# Patient Record
Sex: MALE | Race: WHITE | Employment: FULL TIME | URBAN - METROPOLITAN AREA
[De-identification: names, ages, dates, MRNs, and addresses within clinical notes are randomized per-mention and may not be internally consistent; named-entity substitution may affect disease eponyms.]

---

## 2017-08-30 ENCOUNTER — TELEPHONE (OUTPATIENT)
Dept: FAMILY MEDICINE CLINIC | Facility: CLINIC | Age: 47
End: 2017-08-30

## 2017-10-16 ENCOUNTER — OFFICE VISIT (OUTPATIENT)
Dept: FAMILY MEDICINE CLINIC | Facility: CLINIC | Age: 47
End: 2017-10-16

## 2017-10-16 VITALS
WEIGHT: 241 LBS | DIASTOLIC BLOOD PRESSURE: 84 MMHG | BODY MASS INDEX: 30 KG/M2 | SYSTOLIC BLOOD PRESSURE: 148 MMHG | HEART RATE: 71 BPM

## 2017-10-16 DIAGNOSIS — R10.84 GENERALIZED ABDOMINAL PAIN: ICD-10-CM

## 2017-10-16 DIAGNOSIS — Z00.129 ENCOUNTER FOR ROUTINE CHILD HEALTH EXAMINATION WITHOUT ABNORMAL FINDINGS: ICD-10-CM

## 2017-10-16 DIAGNOSIS — D17.1 LIPOMA OF TORSO: Primary | ICD-10-CM

## 2017-10-16 PROCEDURE — 99214 OFFICE O/P EST MOD 30 MIN: CPT | Performed by: FAMILY MEDICINE

## 2017-10-16 PROCEDURE — 99212 OFFICE O/P EST SF 10 MIN: CPT | Performed by: FAMILY MEDICINE

## 2017-10-16 NOTE — PROGRESS NOTES
HPI:    Patient ID: Jaren Wilson is a 50year old male. HPI  Patient presents with:  Abdominal Pain: c/o middle abdominal pain  Lump: lump on left lower back  Has had lump for years. Review of Systems   Constitutional: Negative.     Gastrointestinal: N

## 2017-10-17 ENCOUNTER — LAB ENCOUNTER (OUTPATIENT)
Dept: LAB | Facility: HOSPITAL | Age: 47
End: 2017-10-17
Attending: FAMILY MEDICINE
Payer: COMMERCIAL

## 2017-10-17 DIAGNOSIS — Z00.129 ENCOUNTER FOR ROUTINE CHILD HEALTH EXAMINATION WITHOUT ABNORMAL FINDINGS: ICD-10-CM

## 2017-10-17 PROCEDURE — 85025 COMPLETE CBC W/AUTO DIFF WBC: CPT

## 2017-10-17 PROCEDURE — 36415 COLL VENOUS BLD VENIPUNCTURE: CPT

## 2017-10-17 PROCEDURE — 80061 LIPID PANEL: CPT

## 2017-10-17 PROCEDURE — 81003 URINALYSIS AUTO W/O SCOPE: CPT

## 2017-10-17 PROCEDURE — 80053 COMPREHEN METABOLIC PANEL: CPT

## 2017-10-24 ENCOUNTER — TELEPHONE (OUTPATIENT)
Dept: FAMILY MEDICINE CLINIC | Facility: CLINIC | Age: 47
End: 2017-10-24

## 2017-10-24 NOTE — TELEPHONE ENCOUNTER
Pt is calling want to know if his lab result can be sent to [facility] please  Pt is requesting a call back

## 2017-10-25 NOTE — TELEPHONE ENCOUNTER
Dr Amie Ragsdale,    Pt inquiring on his 10/17/17 lab results and [facility].

## 2017-11-02 ENCOUNTER — OFFICE VISIT (OUTPATIENT)
Dept: GASTROENTEROLOGY | Facility: CLINIC | Age: 47
End: 2017-11-02

## 2017-11-02 ENCOUNTER — TELEPHONE (OUTPATIENT)
Dept: GASTROENTEROLOGY | Facility: CLINIC | Age: 47
End: 2017-11-02

## 2017-11-02 VITALS
SYSTOLIC BLOOD PRESSURE: 136 MMHG | HEIGHT: 75 IN | HEART RATE: 71 BPM | BODY MASS INDEX: 29.22 KG/M2 | WEIGHT: 235 LBS | DIASTOLIC BLOOD PRESSURE: 80 MMHG

## 2017-11-02 DIAGNOSIS — Z80.0 FAMILY HISTORY OF COLON CANCER IN FATHER: ICD-10-CM

## 2017-11-02 DIAGNOSIS — Z12.11 COLON CANCER SCREENING: Primary | ICD-10-CM

## 2017-11-02 DIAGNOSIS — Z80.0 FAMILY HISTORY OF COLON CANCER: ICD-10-CM

## 2017-11-02 DIAGNOSIS — Z12.11 ENCOUNTER FOR SCREENING COLONOSCOPY: Primary | ICD-10-CM

## 2017-11-02 PROCEDURE — 99243 OFF/OP CNSLTJ NEW/EST LOW 30: CPT | Performed by: PHYSICIAN ASSISTANT

## 2017-11-02 PROCEDURE — 99212 OFFICE O/P EST SF 10 MIN: CPT | Performed by: PHYSICIAN ASSISTANT

## 2017-11-02 RX ORDER — ENZYMES,DIGESTIVE
CAPSULE ORAL
COMMUNITY

## 2017-11-02 RX ORDER — CHLORHEXIDINE GLUCONATE 0.12 MG/ML
RINSE ORAL
Refills: 0 | COMMUNITY
Start: 2017-10-26

## 2017-11-02 NOTE — H&P
[address] [facility] [address] Gastroenterology                                                                                                  Clinic History and Physical     Pa status: Never Smoker                                                              Smokeless tobacco: Never Used                      Alcohol use:  Yes              Comment: daily       Medications (Active prior to today's visit):    Current Outpatient Presc Labs/Imaging:     Patient's labs and imaging were reviewed and discussed with patient today. ASSESSMENT/PLAN:   Luara Oliva is a 50year old male patient of Dr. John Velez with family history of colon cancer (father, diagnosed at age ? 46s) who prese PREP KIT) 17.5-3.13-1.6 GM/180ML Oral Solution 1 Bottle 0      Sig: Take as directed           Imaging & Referrals:  None     CC: Dr. Lis Bowens.  Leanne Saldivar PA-C  11/2/2017

## 2017-11-02 NOTE — PATIENT INSTRUCTIONS
1. Schedule colonoscopy with Dr. Allison Maldonado with IV twilight sedation. 2.  bowel prep from pharmacy - I have prescribed Suprep. This is a smaller volume preparation.  If this is too expensive, however, please call my office and we will order an alter

## 2017-11-02 NOTE — TELEPHONE ENCOUNTER
Scheduled for:  Colonoscopy 50965  Provider Name: Dr García Garcia  Date:  Swati Ellsworth 11/30/17  Location:  [facility]  Sedation:  IV  Time:  10:00 am  Prep: split dose suprep  Meds/Allergies Reconciled?:  NKDA  Diagnosis with codes:  Colon cancer screening Z12.11, [address] [facility] Z80.0  W

## 2017-11-06 NOTE — TELEPHONE ENCOUNTER
I called and spoke to pt about rescheduling his colonoscopy procedure to [facility] 12/19/17 @ [address] [place] [address] @ 9:00 am, pt agreed.     Scheduled for:  Colonoscopy 55961  Provider Name: Dr Jaclyn Bartlett  Date:  Priti Wu from 11/30/17 to [facility] 12/19/17   Location:  Moved from [facility] to [address] [place] [address]

## 2017-11-16 ENCOUNTER — TELEPHONE (OUTPATIENT)
Dept: GASTROENTEROLOGY | Facility: CLINIC | Age: 47
End: 2017-11-16

## 2017-11-16 DIAGNOSIS — Z12.11 COLON CANCER SCREENING: Primary | ICD-10-CM

## 2017-11-16 DIAGNOSIS — Z80.0 FAMILY HISTORY OF COLON CANCER: ICD-10-CM

## 2017-11-17 NOTE — TELEPHONE ENCOUNTER
Rescheduled for:  Colonoscopy 87695  Provider Name: Dr. Jesi Johnson  Date:    From-12/19/17 To-1/23/18  Location:  [facility]  Sedation:  IV  Time:    From-0900  To-0800 (pt is aware to arrive at 0700)   Prep:  Suprep,  Mailed new instructions on 11/17/17  Meds/Allerg

## 2018-01-19 ENCOUNTER — TELEPHONE (OUTPATIENT)
Dept: GASTROENTEROLOGY | Facility: CLINIC | Age: 48
End: 2018-01-19

## 2018-01-19 DIAGNOSIS — Z80.0 FAMILY HISTORY OF MALIGNANT NEOPLASM OF GASTROINTESTINAL TRACT: ICD-10-CM

## 2018-01-19 DIAGNOSIS — Z12.11 SPECIAL SCREENING FOR MALIGNANT NEOPLASMS, COLON: Primary | ICD-10-CM

## 2018-01-19 NOTE — TELEPHONE ENCOUNTER
I spoke to the pt. He states he needs to reschedule his colonoscopy due to another surgery he will be having. He would like to reschedule    appt cancelled in [facility].     Surgical change request sent    Routed back to surgery scheduling

## 2018-01-22 NOTE — TELEPHONE ENCOUNTER
CBLM to schedule procedure. Please transfer to [facility] at ext [phone] or [phone] for scheduling. Or please transfer to [facility] in GI if unavailable.

## 2018-03-08 NOTE — TELEPHONE ENCOUNTER
CBLM to schedule procedure. Please transfer to Juanita Grijalva at ext [phone] or [phone] for scheduling. Or please transfer to [facility] in GI if unavailable.

## 2018-05-03 NOTE — TELEPHONE ENCOUNTER
I spoke with this patient to schedule his procedure which he did not have time today since he is about to go out of town. He requested I call him Monday (mid-day) to schedule.

## 2018-05-07 NOTE — TELEPHONE ENCOUNTER
CBLM to schedule procedure.  Please transfer to [facility] in GI    3 attempts were made to contact this patient, I sent a \"no response\" letter out today and closing this TE

## 2019-03-11 ENCOUNTER — WALK IN (OUTPATIENT)
Dept: URGENT CARE | Age: 49
End: 2019-03-11

## 2019-03-11 DIAGNOSIS — Z11.1 SCREENING-PULMONARY TB: Primary | ICD-10-CM

## 2019-03-11 PROCEDURE — 86580 TB INTRADERMAL TEST: CPT | Performed by: NURSE PRACTITIONER

## 2019-03-13 ENCOUNTER — WALK IN (OUTPATIENT)
Dept: URGENT CARE | Age: 49
End: 2019-03-13

## 2019-03-13 DIAGNOSIS — Z11.1 ENCOUNTER FOR PPD SKIN TEST READING: Primary | ICD-10-CM

## 2019-03-13 LAB
INDURATION: 0 MM (ref 0–?)
SKIN TEST RESULT: NEGATIVE

## 2019-03-13 PROCEDURE — X1094 NO CHARGE VISIT: HCPCS | Performed by: NURSE PRACTITIONER

## 2020-02-20 ENCOUNTER — WALK IN (OUTPATIENT)
Dept: URGENT CARE | Age: 50
End: 2020-02-20

## 2020-02-20 DIAGNOSIS — Z11.1 SCREENING-PULMONARY TB: Primary | ICD-10-CM

## 2020-02-20 PROCEDURE — 86580 TB INTRADERMAL TEST: CPT | Performed by: NURSE PRACTITIONER

## 2020-02-22 ENCOUNTER — WALK IN (OUTPATIENT)
Dept: URGENT CARE | Age: 50
End: 2020-02-22

## 2020-02-22 DIAGNOSIS — Z11.1 ENCOUNTER FOR PPD SKIN TEST READING: Primary | ICD-10-CM

## 2020-02-22 LAB
INDURATION: NORMAL MM (ref 0–?)
SKIN TEST RESULT: NEGATIVE

## 2020-02-22 PROCEDURE — X1094 NO CHARGE VISIT: HCPCS | Performed by: NURSE PRACTITIONER

## 2021-09-13 ENCOUNTER — HOSPITAL ENCOUNTER (EMERGENCY)
Facility: HOSPITAL | Age: 51
Discharge: HOME | End: 2021-09-13
Attending: EMERGENCY MEDICINE
Payer: COMMERCIAL

## 2021-09-13 ENCOUNTER — APPOINTMENT (OUTPATIENT)
Dept: CT IMAGING | Facility: HOSPITAL | Age: 51
End: 2021-09-13
Attending: NURSE PRACTITIONER
Payer: COMMERCIAL

## 2021-09-13 VITALS
SYSTOLIC BLOOD PRESSURE: 153 MMHG | TEMPERATURE: 98 F | RESPIRATION RATE: 20 BRPM | HEIGHT: 75 IN | OXYGEN SATURATION: 97 % | WEIGHT: 265 LBS | DIASTOLIC BLOOD PRESSURE: 95 MMHG | BODY MASS INDEX: 32.95 KG/M2 | HEART RATE: 89 BPM

## 2021-09-13 DIAGNOSIS — N13.30 HYDROURETERONEPHROSIS: ICD-10-CM

## 2021-09-13 DIAGNOSIS — N20.0 KIDNEY STONE: Primary | ICD-10-CM

## 2021-09-13 LAB
ANION GAP SERPL CALC-SCNC: 8 MMOL/L (ref 0–18)
BASOPHILS # BLD AUTO: 0.04 X10(3) UL (ref 0–0.2)
BASOPHILS NFR BLD AUTO: 0.3 %
BILIRUB UR QL: NEGATIVE
BUN BLD-MCNC: 15 MG/DL (ref 7–18)
BUN/CREAT SERPL: 12 (ref 10–20)
CALCIUM BLD-MCNC: 9 MG/DL (ref 8.5–10.1)
CHLORIDE SERPL-SCNC: 103 MMOL/L (ref 98–112)
CLARITY UR: CLEAR
CO2 SERPL-SCNC: 28 MMOL/L (ref 21–32)
COLOR UR: YELLOW
CREAT BLD-MCNC: 1.25 MG/DL
DEPRECATED RDW RBC AUTO: 39.3 FL (ref 35.1–46.3)
EOSINOPHIL # BLD AUTO: 0.01 X10(3) UL (ref 0–0.7)
EOSINOPHIL NFR BLD AUTO: 0.1 %
ERYTHROCYTE [DISTWIDTH] IN BLOOD BY AUTOMATED COUNT: 12.3 % (ref 11–15)
GLUCOSE BLD-MCNC: 138 MG/DL (ref 70–99)
GLUCOSE UR-MCNC: >=500 MG/DL
HCT VFR BLD AUTO: 46.2 %
HGB BLD-MCNC: 15.9 G/DL
HYALINE CASTS #/AREA URNS AUTO: PRESENT /LPF
IMM GRANULOCYTES # BLD AUTO: 0.03 X10(3) UL (ref 0–1)
IMM GRANULOCYTES NFR BLD: 0.3 %
KETONES UR-MCNC: NEGATIVE MG/DL
LEUKOCYTE ESTERASE UR QL STRIP.AUTO: NEGATIVE
LYMPHOCYTES # BLD AUTO: 1.1 X10(3) UL (ref 1–4)
LYMPHOCYTES NFR BLD AUTO: 9.3 %
MCH RBC QN AUTO: 29.9 PG (ref 26–34)
MCHC RBC AUTO-ENTMCNC: 34.4 G/DL (ref 31–37)
MCV RBC AUTO: 86.8 FL
MONOCYTES # BLD AUTO: 0.43 X10(3) UL (ref 0.1–1)
MONOCYTES NFR BLD AUTO: 3.7 %
NEUTROPHILS # BLD AUTO: 10.16 X10 (3) UL (ref 1.5–7.7)
NEUTROPHILS # BLD AUTO: 10.16 X10(3) UL (ref 1.5–7.7)
NEUTROPHILS NFR BLD AUTO: 86.3 %
NITRITE UR QL STRIP.AUTO: NEGATIVE
OSMOLALITY SERPL CALC.SUM OF ELEC: 291 MOSM/KG (ref 275–295)
PH UR: 6 [PH] (ref 5–8)
PLATELET # BLD AUTO: 293 10(3)UL (ref 150–450)
POTASSIUM SERPL-SCNC: 3.7 MMOL/L (ref 3.5–5.1)
PROT UR-MCNC: 30 MG/DL
RBC # BLD AUTO: 5.32 X10(6)UL
RBC #/AREA URNS AUTO: >10 /HPF
SODIUM SERPL-SCNC: 139 MMOL/L (ref 136–145)
SP GR UR STRIP: 1.01 (ref 1–1.03)
UROBILINOGEN UR STRIP-ACNC: <2
WBC # BLD AUTO: 11.8 X10(3) UL (ref 4–11)

## 2021-09-13 PROCEDURE — 96374 THER/PROPH/DIAG INJ IV PUSH: CPT

## 2021-09-13 PROCEDURE — 99284 EMERGENCY DEPT VISIT MOD MDM: CPT

## 2021-09-13 PROCEDURE — 74176 CT ABD & PELVIS W/O CONTRAST: CPT | Performed by: NURSE PRACTITIONER

## 2021-09-13 PROCEDURE — 80048 BASIC METABOLIC PNL TOTAL CA: CPT | Performed by: NURSE PRACTITIONER

## 2021-09-13 PROCEDURE — 81001 URINALYSIS AUTO W/SCOPE: CPT | Performed by: NURSE PRACTITIONER

## 2021-09-13 PROCEDURE — 96361 HYDRATE IV INFUSION ADD-ON: CPT

## 2021-09-13 PROCEDURE — 96375 TX/PRO/DX INJ NEW DRUG ADDON: CPT

## 2021-09-13 PROCEDURE — 85025 COMPLETE CBC W/AUTO DIFF WBC: CPT | Performed by: NURSE PRACTITIONER

## 2021-09-13 RX ORDER — ONDANSETRON 2 MG/ML
4 INJECTION INTRAMUSCULAR; INTRAVENOUS ONCE
Status: COMPLETED | OUTPATIENT
Start: 2021-09-13 | End: 2021-09-13

## 2021-09-13 RX ORDER — KETOROLAC TROMETHAMINE 15 MG/ML
15 INJECTION, SOLUTION INTRAMUSCULAR; INTRAVENOUS ONCE
Status: COMPLETED | OUTPATIENT
Start: 2021-09-13 | End: 2021-09-13

## 2021-09-13 RX ORDER — HYDROCODONE BITARTRATE AND ACETAMINOPHEN 5; 325 MG/1; MG/1
1 TABLET ORAL EVERY 6 HOURS PRN
Qty: 16 TABLET | Refills: 0 | Status: SHIPPED | OUTPATIENT
Start: 2021-09-13 | End: 2021-09-17

## 2021-09-13 RX ORDER — ONDANSETRON 4 MG/1
4 TABLET, ORALLY DISINTEGRATING ORAL EVERY 8 HOURS PRN
Qty: 9 TABLET | Refills: 0 | Status: SHIPPED | OUTPATIENT
Start: 2021-09-13 | End: 2021-09-16

## 2021-09-13 RX ORDER — KETOROLAC TROMETHAMINE 10 MG/1
10 TABLET, FILM COATED ORAL EVERY 8 HOURS PRN
Qty: 21 TABLET | Refills: 0 | Status: SHIPPED | OUTPATIENT
Start: 2021-09-13 | End: 2021-09-20

## 2021-09-13 NOTE — ED INITIAL ASSESSMENT (HPI)
Patient complains of right sided flank pain of sudden onset at 1930 associated with n/v, urinary frequency and decreased urinary output. Pain now in lower abdomen. Also reports chills  associated with bladder distention. Onset 1930. Also reports chills.

## 2021-09-13 NOTE — ED PROVIDER NOTES
Patient Seen in: [facility] AND [facility] Emergency Department      History   Patient presents with:  Urinary Symptoms    Stated Complaint: chills, body aches, and vomited 15 TIMES    Subjective:   HPI    22-year-old male presents the emergency department for e Oropharynx is clear. Eyes:      Extraocular Movements: Extraocular movements intact. Conjunctiva/sclera: Conjunctivae normal.      Pupils: Pupils are equal, round, and reactive to light.    Cardiovascular:      Rate and Rhythm: Normal rate and regula components within normal limits   CBC WITH DIFFERENTIAL WITH PLATELET    Narrative: The following orders were created for panel order CBC With Differential With Platelet.   Procedure                               Abnormality         Status [address]338  [phone]    Schedule an appointment as soon as possible for a visit in 2 days            Medications Prescribed:  Current Discharge Medication List    START taking these medications    HYDROcodone-acetaminophen 5-325 MG Oral Tab  Take 1 tablet b

## 2021-09-30 ENCOUNTER — MED REC SCAN ONLY (OUTPATIENT)
Dept: FAMILY MEDICINE CLINIC | Facility: CLINIC | Age: 51
End: 2021-09-30

## 2021-10-16 ENCOUNTER — HOSPITAL ENCOUNTER (OUTPATIENT)
Dept: CT IMAGING | Age: 51
Discharge: HOME | End: 2021-10-16
Attending: UROLOGY
Payer: COMMERCIAL

## 2021-10-16 DIAGNOSIS — N20.0 KIDNEY STONE: ICD-10-CM

## 2021-10-16 PROCEDURE — 74176 CT ABD & PELVIS W/O CONTRAST: CPT | Performed by: UROLOGY

## 2021-10-22 ENCOUNTER — MED REC SCAN ONLY (OUTPATIENT)
Dept: FAMILY MEDICINE CLINIC | Facility: CLINIC | Age: 51
End: 2021-10-22

## 2022-09-08 ENCOUNTER — OFFICE VISIT (OUTPATIENT)
Dept: INTERNAL MEDICINE CLINIC | Facility: CLINIC | Age: 52
End: 2022-09-08
Payer: COMMERCIAL

## 2022-09-08 VITALS
WEIGHT: 245 LBS | HEART RATE: 89 BPM | HEIGHT: 75 IN | SYSTOLIC BLOOD PRESSURE: 150 MMHG | OXYGEN SATURATION: 98 % | BODY MASS INDEX: 30.46 KG/M2 | DIASTOLIC BLOOD PRESSURE: 90 MMHG

## 2022-09-08 DIAGNOSIS — Z00.00 PHYSICAL EXAM: Primary | ICD-10-CM

## 2022-09-08 DIAGNOSIS — Z80.0 FAMILY HISTORY OF COLON CANCER IN FATHER: ICD-10-CM

## 2022-09-08 DIAGNOSIS — I10 PRIMARY HYPERTENSION: ICD-10-CM

## 2022-09-08 DIAGNOSIS — Z23 NEED FOR SHINGLES VACCINE: ICD-10-CM

## 2022-09-08 PROCEDURE — 90471 IMMUNIZATION ADMIN: CPT | Performed by: FAMILY MEDICINE

## 2022-09-08 PROCEDURE — 90750 HZV VACC RECOMBINANT IM: CPT | Performed by: FAMILY MEDICINE

## 2022-09-08 PROCEDURE — 99386 PREV VISIT NEW AGE 40-64: CPT | Performed by: FAMILY MEDICINE

## 2022-09-08 PROCEDURE — 3077F SYST BP >= 140 MM HG: CPT | Performed by: FAMILY MEDICINE

## 2022-09-08 PROCEDURE — 3008F BODY MASS INDEX DOCD: CPT | Performed by: FAMILY MEDICINE

## 2022-09-08 PROCEDURE — 3080F DIAST BP >= 90 MM HG: CPT | Performed by: FAMILY MEDICINE

## 2022-09-08 RX ORDER — LOSARTAN POTASSIUM 50 MG/1
50 TABLET ORAL DAILY
Qty: 90 TABLET | Refills: 0 | Status: SHIPPED | OUTPATIENT
Start: 2022-09-08

## 2022-09-09 ENCOUNTER — LAB ENCOUNTER (OUTPATIENT)
Dept: LAB | Facility: HOSPITAL | Age: 52
End: 2022-09-09
Attending: FAMILY MEDICINE
Payer: COMMERCIAL

## 2022-09-09 DIAGNOSIS — Z00.00 PHYSICAL EXAM: ICD-10-CM

## 2022-09-09 LAB
ALBUMIN SERPL-MCNC: 3.8 G/DL (ref 3.4–5)
ALBUMIN/GLOB SERPL: 1.1 {RATIO} (ref 1–2)
ALP LIVER SERPL-CCNC: 56 U/L
ALT SERPL-CCNC: 50 U/L
ANION GAP SERPL CALC-SCNC: 8 MMOL/L (ref 0–18)
AST SERPL-CCNC: 32 U/L (ref 15–37)
BASOPHILS # BLD AUTO: 0.03 X10(3) UL (ref 0–0.2)
BASOPHILS NFR BLD AUTO: 0.3 %
BILIRUB SERPL-MCNC: 0.7 MG/DL (ref 0.1–2)
BUN BLD-MCNC: 16 MG/DL (ref 7–18)
BUN/CREAT SERPL: 15.2 (ref 10–20)
CALCIUM BLD-MCNC: 8.8 MG/DL (ref 8.5–10.1)
CHLORIDE SERPL-SCNC: 102 MMOL/L (ref 98–112)
CHOLEST SERPL-MCNC: 250 MG/DL (ref ?–200)
CO2 SERPL-SCNC: 28 MMOL/L (ref 21–32)
COMPLEXED PSA SERPL-MCNC: 1.46 NG/ML (ref ?–4)
CREAT BLD-MCNC: 1.05 MG/DL
DEPRECATED RDW RBC AUTO: 39.6 FL (ref 35.1–46.3)
EOSINOPHIL # BLD AUTO: 0.14 X10(3) UL (ref 0–0.7)
EOSINOPHIL NFR BLD AUTO: 1.6 %
ERYTHROCYTE [DISTWIDTH] IN BLOOD BY AUTOMATED COUNT: 12.3 % (ref 11–15)
FASTING PATIENT LIPID ANSWER: YES
FASTING STATUS PATIENT QL REPORTED: YES
GFR SERPLBLD BASED ON 1.73 SQ M-ARVRAT: 84 ML/MIN/1.73M2 (ref 60–?)
GLOBULIN PLAS-MCNC: 3.6 G/DL (ref 2.8–4.4)
GLUCOSE BLD-MCNC: 84 MG/DL (ref 70–99)
HCT VFR BLD AUTO: 49.7 %
HDLC SERPL-MCNC: 54 MG/DL (ref 40–59)
HGB BLD-MCNC: 16.7 G/DL
IMM GRANULOCYTES # BLD AUTO: 0.02 X10(3) UL (ref 0–1)
IMM GRANULOCYTES NFR BLD: 0.2 %
LDLC SERPL CALC-MCNC: 169 MG/DL (ref ?–100)
LYMPHOCYTES # BLD AUTO: 1.74 X10(3) UL (ref 1–4)
LYMPHOCYTES NFR BLD AUTO: 19.8 %
MCH RBC QN AUTO: 29.5 PG (ref 26–34)
MCHC RBC AUTO-ENTMCNC: 33.6 G/DL (ref 31–37)
MCV RBC AUTO: 87.8 FL
MONOCYTES # BLD AUTO: 0.7 X10(3) UL (ref 0.1–1)
MONOCYTES NFR BLD AUTO: 8 %
NEUTROPHILS # BLD AUTO: 6.15 X10 (3) UL (ref 1.5–7.7)
NEUTROPHILS # BLD AUTO: 6.15 X10(3) UL (ref 1.5–7.7)
NEUTROPHILS NFR BLD AUTO: 70.1 %
NONHDLC SERPL-MCNC: 196 MG/DL (ref ?–130)
OSMOLALITY SERPL CALC.SUM OF ELEC: 286 MOSM/KG (ref 275–295)
PLATELET # BLD AUTO: 262 10(3)UL (ref 150–450)
POTASSIUM SERPL-SCNC: 4.2 MMOL/L (ref 3.5–5.1)
PROT SERPL-MCNC: 7.4 G/DL (ref 6.4–8.2)
RBC # BLD AUTO: 5.66 X10(6)UL
SODIUM SERPL-SCNC: 138 MMOL/L (ref 136–145)
TRIGL SERPL-MCNC: 148 MG/DL (ref 30–149)
VLDLC SERPL CALC-MCNC: 29 MG/DL (ref 0–30)
WBC # BLD AUTO: 8.8 X10(3) UL (ref 4–11)

## 2022-09-09 PROCEDURE — 80053 COMPREHEN METABOLIC PANEL: CPT

## 2022-09-09 PROCEDURE — 80061 LIPID PANEL: CPT

## 2022-09-09 PROCEDURE — 85025 COMPLETE CBC W/AUTO DIFF WBC: CPT

## 2022-09-09 PROCEDURE — 36415 COLL VENOUS BLD VENIPUNCTURE: CPT

## 2022-10-31 ENCOUNTER — NURSE ONLY (OUTPATIENT)
Facility: CLINIC | Age: 52
End: 2022-10-31

## 2022-10-31 DIAGNOSIS — Z80.0 FAMILY HISTORY OF MALIGNANT NEOPLASM OF GASTROINTESTINAL TRACT: ICD-10-CM

## 2022-10-31 DIAGNOSIS — Z12.11 SCREEN FOR COLON CANCER: Primary | ICD-10-CM

## 2022-10-31 NOTE — PROGRESS NOTES
Mauricio Silverman,     Called patient for a scheduled telephone colon screening. GI MD preference: none  Insurance:  BCBS  CBC from: 9/9/2022  PCP visit on:  9/8/2022    First colonoscopy: yes     Anticoagulants: no  Diabetic Meds: no  BP meds(Ace inhibitors/ARB's): no  Weight loss meds (phentermine/vyvanse): no  Iron supplement (RX/OTC): no  Height & Weight/BMI: 6'3\"/245LBS/30  Hx of Cardiac/CVA issues/(MI/Stroke): no  Devices Pacemaker/Defibrillator/Stents: no  Resp. Issues/Oxygen Use/VAL[place]INA/COPD: no  Issues w/Anesthesia: no    Symptoms (Y/N): no     Family history of colon cancer: Father, onset unknown     Medications, pharmacy, and allergies verified with patient over the phone. Please advise on orders and prep, thank you.

## 2022-11-02 RX ORDER — POLYETHYLENE GLYCOL 3350, SODIUM CHLORIDE, SODIUM BICARBONATE, POTASSIUM CHLORIDE 420; 11.2; 5.72; 1.48 G/4L; G/4L; G/4L; G/4L
POWDER, FOR SOLUTION ORAL
Qty: 4000 ML | Refills: 0 | Status: SHIPPED | OUTPATIENT
Start: 2022-11-02

## 2022-11-02 NOTE — PROGRESS NOTES
Scheduling:  cln w/ iv or mac w/ any md  Dx: crc screening, fhx colon ca   trilyte split dose    Hold losartan am of procedure if w/ mac

## 2022-11-07 ENCOUNTER — OFFICE VISIT (OUTPATIENT)
Dept: INTERNAL MEDICINE CLINIC | Facility: CLINIC | Age: 52
End: 2022-11-07
Payer: COMMERCIAL

## 2022-11-07 VITALS
HEIGHT: 75 IN | HEART RATE: 84 BPM | WEIGHT: 240 LBS | OXYGEN SATURATION: 98 % | BODY MASS INDEX: 29.84 KG/M2 | SYSTOLIC BLOOD PRESSURE: 132 MMHG | DIASTOLIC BLOOD PRESSURE: 84 MMHG

## 2022-11-07 DIAGNOSIS — E78.5 HYPERLIPIDEMIA, UNSPECIFIED HYPERLIPIDEMIA TYPE: ICD-10-CM

## 2022-11-07 DIAGNOSIS — I10 PRIMARY HYPERTENSION: Primary | ICD-10-CM

## 2022-11-07 DIAGNOSIS — Z11.1 ENCOUNTER FOR PPD TEST: ICD-10-CM

## 2022-11-07 PROCEDURE — 3008F BODY MASS INDEX DOCD: CPT | Performed by: FAMILY MEDICINE

## 2022-11-07 PROCEDURE — 99214 OFFICE O/P EST MOD 30 MIN: CPT | Performed by: FAMILY MEDICINE

## 2022-11-07 PROCEDURE — 3075F SYST BP GE 130 - 139MM HG: CPT | Performed by: FAMILY MEDICINE

## 2022-11-07 PROCEDURE — 3079F DIAST BP 80-89 MM HG: CPT | Performed by: FAMILY MEDICINE

## 2022-11-07 RX ORDER — HYDROCHLOROTHIAZIDE 12.5 MG/1
12.5 CAPSULE, GELATIN COATED ORAL DAILY
Qty: 90 CAPSULE | Refills: 3 | Status: SHIPPED | OUTPATIENT
Start: 2022-11-07

## 2022-11-08 NOTE — PROGRESS NOTES
Scheduled for:  Colonoscopy-screen 40857    Provider Name:  Dr. Fide Regalado   Date:  1/17/2023  Location:  [facility]  Sedation:  MAC  Time:  10:45 AM Patient made aware [facility] will call the day before with an arrival time. Prep:  Split dose trilyte E-Prescribing Status: Receipt confirmed by pharmacy (11/2/2022 10:03 AM CDT)  Meds/Allergies Reconciled?: kelly Noel reviewed     Diagnosis with codes:  Colorectal cancer screening Z12.11, CC Z80.0  Was patient informed to call insurance with codes (Y/N): I confirmed [employer] with this patient. Referral sent?:  Referral was sent. [facility] or [address] notified?:  Electronic case request was sent to [facility] via [employer]. Medication Orders:  Per pt. He no longer takes losartan; now takes hydrochlorothiazide   Misc Orders:  Patient was informed that they will need a COVID 19 test prior to their procedure. Patient verbally understood & will await a phone call from [facility] to schedule. Further instructions given by staff:  I discussed the prep instructions with the patient which he verbally understood and is aware that I will send the instructions today via [address]

## 2023-01-17 ENCOUNTER — LAB REQUISITION (OUTPATIENT)
Dept: SURGERY | Age: 53
End: 2023-01-17
Payer: COMMERCIAL

## 2023-01-17 ENCOUNTER — SURGERY CENTER DOCUMENTATION (OUTPATIENT)
Dept: SURGERY | Age: 53
End: 2023-01-17

## 2023-01-17 DIAGNOSIS — Z12.11 SCREEN FOR COLON CANCER: ICD-10-CM

## 2023-01-17 DIAGNOSIS — Z80.0 FAMILY HISTORY OF COLON CANCER IN FATHER: ICD-10-CM

## 2023-01-17 PROCEDURE — 45380 COLONOSCOPY AND BIOPSY: CPT | Performed by: INTERNAL MEDICINE

## 2023-01-17 PROCEDURE — 88305 TISSUE EXAM BY PATHOLOGIST: CPT | Performed by: INTERNAL MEDICINE

## 2023-01-17 PROCEDURE — 45385 COLONOSCOPY W/LESION REMOVAL: CPT | Performed by: INTERNAL MEDICINE

## 2023-02-08 ENCOUNTER — TELEPHONE (OUTPATIENT)
Facility: CLINIC | Age: 53
End: 2023-02-08

## 2023-02-08 NOTE — TELEPHONE ENCOUNTER
Health Maintenance Updated. 5 year colonoscopy recall entered into patient outreach in [address] [place] [address] Rd. Next colonoscopy will be due 1/17/2028.

## 2023-02-08 NOTE — TELEPHONE ENCOUNTER
----- Message from Merly Romero MD sent at 2/8/2023 10:43 AM CST -----  GI staff: please place recall for colonoscopy in 5 years

## 2023-04-18 DIAGNOSIS — I10 PRIMARY HYPERTENSION: ICD-10-CM

## 2023-04-18 RX ORDER — HYDROCHLOROTHIAZIDE 12.5 MG/1
12.5 CAPSULE, GELATIN COATED ORAL DAILY
Qty: 90 CAPSULE | Refills: 0 | Status: SHIPPED | OUTPATIENT
Start: 2023-04-18

## 2023-05-17 ENCOUNTER — TELEPHONE (OUTPATIENT)
Dept: INTERNAL MEDICINE CLINIC | Facility: CLINIC | Age: 53
End: 2023-05-17

## 2023-05-18 ENCOUNTER — LAB ENCOUNTER (OUTPATIENT)
Dept: LAB | Age: 53
End: 2023-05-18
Attending: FAMILY MEDICINE
Payer: COMMERCIAL

## 2023-05-18 DIAGNOSIS — Z01.84 IMMUNITY STATUS TESTING: ICD-10-CM

## 2023-05-18 PROCEDURE — 86787 VARICELLA-ZOSTER ANTIBODY: CPT | Performed by: FAMILY MEDICINE

## 2023-05-19 LAB — VZV IGG SER IA-ACNC: >4000 (ref 165–?)

## 2023-07-26 DIAGNOSIS — I10 PRIMARY HYPERTENSION: ICD-10-CM

## 2023-07-26 RX ORDER — HYDROCHLOROTHIAZIDE 12.5 MG/1
12.5 CAPSULE, GELATIN COATED ORAL DAILY
Qty: 90 CAPSULE | Refills: 0 | Status: SHIPPED | OUTPATIENT
Start: 2023-07-26

## 2023-07-26 NOTE — TELEPHONE ENCOUNTER
A refill request was received for:  Requested Prescriptions     Pending Prescriptions Disp Refills    hydroCHLOROthiazide 12.5 MG Oral Cap 90 capsule 0     Sig: Take 1 capsule (12.5 mg total) by mouth daily. Last refill date:  4/18/23    Last office visit: 11/7/22      No future appointments.

## 2023-11-01 DIAGNOSIS — I10 PRIMARY HYPERTENSION: ICD-10-CM

## 2023-11-01 RX ORDER — HYDROCHLOROTHIAZIDE 12.5 MG/1
12.5 CAPSULE, GELATIN COATED ORAL DAILY
Qty: 90 CAPSULE | Refills: 0 | Status: SHIPPED | OUTPATIENT
Start: 2023-11-01

## 2023-11-01 NOTE — TELEPHONE ENCOUNTER
Future Appointment:none      Last Appointment:11/7/22      Last Refill:7/26/23      Medication Requested:   Requested Prescriptions     Pending Prescriptions Disp Refills    HYDROCHLOROTHIAZIDE 12.5 MG Oral Cap [Pharmacy Med Name: HYDROCHLOROTHIAZIDE 12.5MG CAPSULES] 90 capsule 0     Sig: TAKE 1 CAPSULE(12.5 MG) BY MOUTH DAILY       Protocol :     Hypertension Medications Protocol Vaazrm1411/01/2023 09:29 AM   Protocol Details CMP or BMP in past 12 months    Appointment in past 6 or next 3 months    Last serum creatinine< 2.0

## 2024-01-25 ENCOUNTER — OFFICE VISIT (OUTPATIENT)
Dept: INTERNAL MEDICINE CLINIC | Facility: CLINIC | Age: 54
End: 2024-01-25
Payer: COMMERCIAL

## 2024-01-25 VITALS
DIASTOLIC BLOOD PRESSURE: 98 MMHG | HEIGHT: 75 IN | HEART RATE: 73 BPM | BODY MASS INDEX: 30.71 KG/M2 | WEIGHT: 247 LBS | OXYGEN SATURATION: 96 % | SYSTOLIC BLOOD PRESSURE: 146 MMHG

## 2024-01-25 DIAGNOSIS — E78.5 HYPERLIPIDEMIA, UNSPECIFIED HYPERLIPIDEMIA TYPE: ICD-10-CM

## 2024-01-25 DIAGNOSIS — I10 PRIMARY HYPERTENSION: ICD-10-CM

## 2024-01-25 DIAGNOSIS — Z00.00 PHYSICAL EXAM: Primary | ICD-10-CM

## 2024-01-25 PROCEDURE — 3080F DIAST BP >= 90 MM HG: CPT | Performed by: FAMILY MEDICINE

## 2024-01-25 PROCEDURE — 90471 IMMUNIZATION ADMIN: CPT | Performed by: FAMILY MEDICINE

## 2024-01-25 PROCEDURE — 99396 PREV VISIT EST AGE 40-64: CPT | Performed by: FAMILY MEDICINE

## 2024-01-25 PROCEDURE — 3008F BODY MASS INDEX DOCD: CPT | Performed by: FAMILY MEDICINE

## 2024-01-25 PROCEDURE — 3077F SYST BP >= 140 MM HG: CPT | Performed by: FAMILY MEDICINE

## 2024-01-25 PROCEDURE — 90750 HZV VACC RECOMBINANT IM: CPT | Performed by: FAMILY MEDICINE

## 2024-01-25 NOTE — PROGRESS NOTES
Louie Arteaga is a 53 year old male who presents for a complete physical exam.   HPI:       Diet: eats healthy   Exercise: not right now bc of elbow injury- going to PT- getting better   No CP or SOB  Mood good     HTN- on hydrochlorothiazide 12.5 mg   Did not sleep well last night and stressed so thinks that is why BP high      Alcohol- social  - [employer]  Wife tammy arteaga  2 kids         Wt Readings from Last 6 Encounters:   01/25/24 247 lb (112 kg)   11/07/22 240 lb (108.9 kg)   09/08/22 245 lb (111.1 kg)   09/13/21 265 lb (120.2 kg)   06/18/21 235 lb (106.6 kg)   06/03/21 235 lb (106.6 kg)     Body mass index is 30.87 kg/m².     Cholesterol, Total (mg/dL)   Date Value   09/09/2022 250 (H)   10/17/2017 248 (H)   11/08/2014 232 (H)     HDL Cholesterol (mg/dL)   Date Value   09/09/2022 54   10/17/2017 56     HDL CHOLESTEROL (P) (mg/dL)   Date Value   11/08/2014 59     LDL Cholesterol (mg/dL)   Date Value   09/09/2022 169 (H)   10/17/2017 165 (H)   11/08/2014 154 (H)     AST (U/L)   Date Value   09/09/2022 32   10/17/2017 33   11/08/2014 29     ALT (U/L)   Date Value   09/09/2022 50   10/17/2017 42   11/08/2014 25      Current Outpatient Medications   Medication Sig Dispense Refill    hydroCHLOROthiazide 12.5 MG Oral Cap Take 1 capsule (12.5 mg total) by mouth daily. 90 capsule 0      Past Medical History:   Diagnosis Date    Other and unspecified hyperlipidemia       Past Surgical History:   Procedure Laterality Date    HERNIA SURGERY      KNEE ARTHROSCOPY      VASECTOMY  2015      Family History   Problem Relation Age of Onset    Colon Cancer Father     Cancer Father     Diabetes Maternal Grandmother     Heart Attack Maternal Grandfather     Heart Disorder Paternal Grandmother       Social History:  Social History     Socioeconomic History    Marital status:     Number of children: 3   Occupational History    Occupation:     Tobacco Use    Smoking status: Never     Smokeless tobacco: Never   Substance and Sexual Activity    Alcohol use: Yes     Comment: daily    Drug use: No           REVIEW OF SYSTEMS:   GENERAL: feels well otherwise  LUNGS: denies shortness of breath with exertion  CARDIOVASCULAR: denies chest pain on exertion  GI: denies abdominal pain, constipation or diarrhea  : denies nocturia or changes in stream, urinates one time overnight   NEURO: denies headaches  PSYCHE: denies depression or anxiety    EXAM:   BP (!) 150/104   Pulse 73   Ht 6' 3\" (1.905 m)   Wt 247 lb (112 kg)   SpO2 96%   BMI 30.87 kg/m²   Body mass index is 30.87 kg/m².    Recheck 146/98  GENERAL: well developed, well nourished,in no apparent distress  SKIN: no rashes,no suspicious lesions  HEENT: atraumatic, normocephalic, O/P normal  EYES: nl conjunctiva  NECK: supple,no adenopathy   LUNGS: clear to auscultation  CARDIO: RRR without murmur  GI: good BS's,no masses, HSM or tenderness  EXTREMITIES: no cyanosis, clubbing or edema  NEURO: Oriented times three,cranial nerves are intact,motor and sensory are grossly intact    ASSESSMENT AND PLAN:   Louie Cleaning is a 53 year old male who presents for a complete physical exam.  Encounter Diagnosis   Name Primary?    Physical exam Yes     Health maintenance:  check fasting Lipids, CMP, CBC    Encouraged regular exercise and low fat diet.   The patient indicates understanding of these issues and agrees to the plan.  The patient is asked to return for CPX in 1 yr  1. Physical exam    - CBC With Differential With Platelet  - Comp Metabolic Panel (14)  - Lipid Panel  - PSA (Screening) [E]; Future  - Zoster Recombinant Adjuvanted (Shingrix -Shingles) [55314]    2. Hyperlipidemia, unspecified hyperlipidemia type  Recheck lipids - will contact him with results   - CT CALCIUM SCORING; Future    3. Primary hypertension  Not controlled  Currently on 12.5 mg hydrochlorothiazide   Reluctant to increase meds but will try hydrochlorothiazide 25 mg   Had  Side effects on losartan and also hydrochlorothiazide 25 but will retry hydrochlorothiazide 25 . monitor BP at home and f/u 2 mos for BP check, sooner if elevated at home  D/w him the importance of controlling risks for CV disease- both HTN and HL        No orders of the defined types were placed in this encounter.      Meds & Refills for this Visit:  Requested Prescriptions      No prescriptions requested or ordered in this encounter       Imaging & Consults:  None

## 2024-01-28 ENCOUNTER — PATIENT MESSAGE (OUTPATIENT)
Dept: INTERNAL MEDICINE CLINIC | Facility: CLINIC | Age: 54
End: 2024-01-28

## 2024-01-28 DIAGNOSIS — I10 PRIMARY HYPERTENSION: Primary | ICD-10-CM

## 2024-01-29 DIAGNOSIS — I10 PRIMARY HYPERTENSION: ICD-10-CM

## 2024-01-29 RX ORDER — AMLODIPINE BESYLATE 2.5 MG/1
2.5 TABLET ORAL DAILY
Qty: 30 TABLET | Refills: 0 | Status: SHIPPED | OUTPATIENT
Start: 2024-01-29

## 2024-01-30 RX ORDER — AMLODIPINE BESYLATE 2.5 MG/1
2.5 TABLET ORAL DAILY
Qty: 90 TABLET | Refills: 0 | OUTPATIENT
Start: 2024-01-30

## 2024-01-30 NOTE — TELEPHONE ENCOUNTER
From: Louie Cleaning  To: Lyudmila Cleveland  Sent: 1/28/2024 11:14 AM CST  Subject: Medication    Need to alter or look at alternative medication for me. Dizziness when I stand up quickly, crankiness and headaches. Not feeling the greatest

## 2024-02-12 DIAGNOSIS — I10 PRIMARY HYPERTENSION: ICD-10-CM

## 2024-02-12 RX ORDER — HYDROCHLOROTHIAZIDE 12.5 MG/1
12.5 CAPSULE, GELATIN COATED ORAL DAILY
Qty: 90 CAPSULE | Refills: 0 | OUTPATIENT
Start: 2024-02-12

## 2024-02-12 NOTE — TELEPHONE ENCOUNTER
Future Appointment:None      Last Appointment:1/25/24      Last Refill:11/1/23      Medication Requested:  Requested Prescriptions     Pending Prescriptions Disp Refills    HYDROCHLOROTHIAZIDE 12.5 MG Oral Cap [Pharmacy Med Name: HYDROCHLOROTHIAZIDE 12.5MG CAPSULES] 90 capsule 0     Sig: TAKE 1 CAPSULE(12.5 MG) BY MOUTH DAILY        Protocol :     Hypertension Medications Protocol Hnhfly3802/12/2024 10:29 AM   Protocol Details CMP or BMP in past 12 months    Last BP reading less than 140/90    EGFRCR or GFRNAA > 50    In person appointment or virtual visit in the past 12 mos or appointment in next 3 mos

## 2024-02-29 DIAGNOSIS — I10 PRIMARY HYPERTENSION: ICD-10-CM

## 2024-02-29 RX ORDER — AMLODIPINE BESYLATE 2.5 MG/1
2.5 TABLET ORAL DAILY
Qty: 30 TABLET | Refills: 1 | Status: SHIPPED | OUTPATIENT
Start: 2024-02-29 | End: 2024-03-04

## 2024-03-01 NOTE — TELEPHONE ENCOUNTER
I called and left a voicemail message for the patient to schedule an appointment with Dr. Cleveland.

## 2024-03-04 RX ORDER — AMLODIPINE BESYLATE 2.5 MG/1
2.5 TABLET ORAL DAILY
Qty: 90 TABLET | Refills: 0 | Status: SHIPPED | OUTPATIENT
Start: 2024-03-04

## 2024-04-26 NOTE — PROGRESS NOTES
CC:  Hypertension (Pt presents to clinic for BP follow up. Does not check at home.)      Hx of CC:    F/u HTN  Now on amlodipine 2.5 mg   Didn't increase dose or check at home  No side effects  No CP or SOB             Allergies:  No Known Allergies   Current Meds:  Current Outpatient Medications   Medication Sig Dispense Refill    amLODIPine 2.5 MG Oral Tab Take 1 tablet (2.5 mg total) by mouth daily. 90 tablet 0        History:  Past Medical History:    Other and unspecified hyperlipidemia      Past Surgical History:   Procedure Laterality Date    Hernia surgery      Knee arthroscopy      Vasectomy        Family History   Problem Relation Age of Onset    Colon Cancer Father     Cancer Father     Diabetes Maternal Grandmother     Heart Attack Maternal Grandfather     Heart Disorder Paternal Grandmother       Family Status   Relation Status    Fa Alive    MGMA     MGFA     PGMA     Mo Alive    PGFA     Sis Alive      Social History     Socioeconomic History    Marital status:     Number of children: 3   Occupational History    Occupation:     Tobacco Use    Smoking status: Never    Smokeless tobacco: Never   Substance and Sexual Activity    Alcohol use: Yes     Comment: daily    Drug use: No            ROS:  General:  No fever, no fatigue, no weight changes  Cardio:  No chest pain   Pulmonary:  No cough, no SOB  No edema      Physical:    BP (!) 138/98   Pulse 66   Resp 16   Ht 6' 3\" (1.905 m)   Wt 247 lb (112 kg)   SpO2 97%   BMI 30.87 kg/m²     Recheck blood pressure same  General:  Alert, appropriate, no acute distress  HEENT:  Normocephalic, supple. Moist mucus membranes.  Cardio:  RRR, no murmurs, S1, S2  Pulmonary:  Clear bilaterally, good air entry  Dermatologic:  No rashes or lesions  EXT: no edema  MS: normal movement   NEURO: no gross deficits       Assessment and Plan:    1. Primary hypertension  Increase amlodipine to 5 mg   Follow up 2 month.  Check BP at home or at [facility]. Pt to call if having side effects. Check BP 2-3 times a week. Low salt diet. Diet and exercise encouraged  Message me in 1-2 weeks with update    - Vital Signs Assessment Request at [facility]    2. Hyperlipidemia, unspecified hyperlipidemia type  Check lipids- ordered     There are no diagnoses linked to this encounter.  None  No orders of the defined types were placed in this encounter.

## 2024-04-29 ENCOUNTER — OFFICE VISIT (OUTPATIENT)
Dept: INTERNAL MEDICINE CLINIC | Facility: CLINIC | Age: 54
End: 2024-04-29
Payer: COMMERCIAL

## 2024-04-29 VITALS
WEIGHT: 247 LBS | SYSTOLIC BLOOD PRESSURE: 138 MMHG | OXYGEN SATURATION: 97 % | HEIGHT: 75 IN | RESPIRATION RATE: 16 BRPM | BODY MASS INDEX: 30.71 KG/M2 | HEART RATE: 66 BPM | DIASTOLIC BLOOD PRESSURE: 98 MMHG

## 2024-04-29 DIAGNOSIS — I10 PRIMARY HYPERTENSION: Primary | ICD-10-CM

## 2024-04-29 DIAGNOSIS — E78.5 HYPERLIPIDEMIA, UNSPECIFIED HYPERLIPIDEMIA TYPE: ICD-10-CM

## 2024-04-29 PROCEDURE — 99213 OFFICE O/P EST LOW 20 MIN: CPT | Performed by: FAMILY MEDICINE

## 2024-06-04 DIAGNOSIS — I10 PRIMARY HYPERTENSION: ICD-10-CM

## 2024-06-04 RX ORDER — AMLODIPINE BESYLATE 2.5 MG/1
2.5 TABLET ORAL DAILY
Qty: 90 TABLET | Refills: 0 | Status: SHIPPED | OUTPATIENT
Start: 2024-06-04

## 2024-06-04 NOTE — TELEPHONE ENCOUNTER
Future Appointment:No future appointment      Last Appointment:4/29/24      Last Refill:3/4/24      Medication Requested:   Requested Prescriptions     Pending Prescriptions Disp Refills    AMLODIPINE 2.5 MG Oral Tab [Pharmacy Med Name: AMLODIPINE BESYLATE 2.5MG TABLETS] 90 tablet 0     Sig: TAKE 1 TABLET(2.5 MG) BY MOUTH DAILY     Protocol :       Hypertension Medications Protocol Tqpjls0206/04/2024 10:15 AM   Protocol Details CMP or BMP in past 12 months    Last BP reading less than 140/90    EGFRCR or GFRNAA > 50    In person appointment or virtual visit in the past 12 mos or appointment in next 3 mos

## 2024-08-29 DIAGNOSIS — I10 PRIMARY HYPERTENSION: ICD-10-CM

## 2024-08-29 RX ORDER — AMLODIPINE BESYLATE 2.5 MG/1
2.5 TABLET ORAL DAILY
Qty: 90 TABLET | Refills: 0 | Status: SHIPPED | OUTPATIENT
Start: 2024-08-29

## 2024-08-29 NOTE — TELEPHONE ENCOUNTER
Future Appointment:None      Last Appointment:4/29/24      Last Refill:6/4/24      Medication Requested:   Requested Prescriptions     Pending Prescriptions Disp Refills    AMLODIPINE 2.5 MG Oral Tab [Pharmacy Med Name: AMLODIPINE BESYLATE 2.5MG TABLETS] 90 tablet 0     Sig: TAKE 1 TABLET(2.5 MG) BY MOUTH DAILY     Protocol :     Hypertension Medications Protocol Dxyvjj2308/29/2024 07:51 AM   Protocol Details CMP or BMP in past 12 months    Last BP reading less than 140/90    EGFRCR or GFRNAA > 50    In person appointment or virtual visit in the past 12 mos or appointment in next 3 mos

## 2024-11-21 DIAGNOSIS — I10 PRIMARY HYPERTENSION: ICD-10-CM

## 2024-11-21 RX ORDER — AMLODIPINE BESYLATE 2.5 MG/1
2.5 TABLET ORAL DAILY
Qty: 90 TABLET | Refills: 1 | Status: SHIPPED | OUTPATIENT
Start: 2024-11-21

## 2024-11-21 NOTE — TELEPHONE ENCOUNTER
Future Appointment:None      Last Appointment:4/29/24      Last Refill:8/29/24      Medication Requested:   Requested Prescriptions     Pending Prescriptions Disp Refills    AMLODIPINE 2.5 MG Oral Tab [Pharmacy Med Name: AMLODIPINE BESYLATE 2.5MG TABLETS] 90 tablet 0     Sig: TAKE 1 TABLET(2.5 MG) BY MOUTH DAILY     Protocol :       Hypertension Medications Protocol Gqrpdf1611/21/2024 08:45 AM   Protocol Details CMP or BMP in past 12 months    Last BP reading less than 140/90    EGFRCR or GFRNAA > 50    In person appointment or virtual visit in the past 12 mos or appointment in next 3 mos        
individual instruction

## 2025-02-18 ENCOUNTER — OFFICE VISIT (OUTPATIENT)
Dept: FAMILY MEDICINE CLINIC | Facility: CLINIC | Age: 55
End: 2025-02-18

## 2025-02-18 DIAGNOSIS — Z11.1 SCREENING FOR TUBERCULOSIS: Primary | ICD-10-CM

## 2025-02-18 PROCEDURE — 86580 TB INTRADERMAL TEST: CPT | Performed by: NURSE PRACTITIONER

## 2025-02-18 NOTE — PATIENT INSTRUCTIONS
You will need to return to clinic in 48-72 hours to have results of TB test read.     Please return to clinic on THUR 2/20/25 after 12:45pm or on FRI 2/21/25 before 12:40pm to have your TB test read.    If you do not return during this time your test will need to be repeated.     Our clinic hours are:  Monday-Friday        8:00 am to 7:30 pm    You can go to any of the [facility] Walk-In Clinics to have your TB test read.  To find your nearest Walk-In Clinic go to [email or web address]

## 2025-02-18 NOTE — PROGRESS NOTES
Louie Cleaning is a 54 year old male who presents for TB testing.    See TB flow sheet.  TB skin test placed today on LEFT forearm.

## 2025-02-20 ENCOUNTER — OFFICE VISIT (OUTPATIENT)
Dept: FAMILY MEDICINE CLINIC | Facility: CLINIC | Age: 55
End: 2025-02-20
Payer: COMMERCIAL

## 2025-02-20 ENCOUNTER — TELEPHONE (OUTPATIENT)
Dept: INTERNAL MEDICINE CLINIC | Facility: CLINIC | Age: 55
End: 2025-02-20

## 2025-02-20 DIAGNOSIS — Z01.84 IMMUNITY STATUS TESTING: Primary | ICD-10-CM

## 2025-02-20 DIAGNOSIS — Z23 NEED FOR INFLUENZA VACCINATION: Primary | ICD-10-CM

## 2025-02-20 DIAGNOSIS — Z00.00 PHYSICAL EXAM: ICD-10-CM

## 2025-02-20 PROCEDURE — 90471 IMMUNIZATION ADMIN: CPT | Performed by: NURSE PRACTITIONER

## 2025-02-20 PROCEDURE — 90656 IIV3 VACC NO PRSV 0.5 ML IM: CPT | Performed by: NURSE PRACTITIONER

## 2025-02-20 NOTE — TELEPHONE ENCOUNTER
Patient started a new job and needs proof of vaccination.    Can Dr. Cleveland place an order for titers to be drawn?    Patient was last seen in the office on 04/29/024 for an exam;  patient has no future appointments scheduled.    Please call patient when orders have been placed and are available to draw:    [phone]     KG

## 2025-02-20 NOTE — TELEPHONE ENCOUNTER
I did order them  I also ordered his annual labs  He is due to see me  Needs to schedule PE- 20 min thanks

## 2025-02-20 NOTE — PROGRESS NOTES
Pt presents to [facility] for TB reading, timeframe appropriate.  TB test is negative and pt given documentation of results.    Pt also requesting influenza vaccine.  Vaccination Screening Questionnaire filled out by patient. Form was reviewed by health care provider then scanned into chart.   No contraindications to vaccination.  Vaccination information sheet given.  Side effects and risks of vaccination explained and discussed.  Pt voiced understanding and agreed to proceed with vaccination.  Vaccination given and pt tolerated well.      Pt given copy of full immunization record, advised Tdap UTD.

## 2025-02-21 ENCOUNTER — PATIENT MESSAGE (OUTPATIENT)
Dept: INTERNAL MEDICINE CLINIC | Facility: CLINIC | Age: 55
End: 2025-02-21

## 2025-02-25 ENCOUNTER — OFFICE VISIT (OUTPATIENT)
Dept: FAMILY MEDICINE CLINIC | Facility: CLINIC | Age: 55
End: 2025-02-25
Payer: COMMERCIAL

## 2025-02-25 DIAGNOSIS — Z23 NEED FOR HEPATITIS B VACCINATION: Primary | ICD-10-CM

## 2025-02-25 DIAGNOSIS — Z23 NEED FOR MMR VACCINE: ICD-10-CM

## 2025-02-25 PROCEDURE — 90471 IMMUNIZATION ADMIN: CPT | Performed by: NURSE PRACTITIONER

## 2025-02-25 PROCEDURE — 90746 HEPB VACCINE 3 DOSE ADULT IM: CPT | Performed by: NURSE PRACTITIONER

## 2025-02-25 PROCEDURE — 90472 IMMUNIZATION ADMIN EACH ADD: CPT | Performed by: NURSE PRACTITIONER

## 2025-02-25 PROCEDURE — 90707 MMR VACCINE SC: CPT | Performed by: NURSE PRACTITIONER

## 2025-02-25 NOTE — PROGRESS NOTES
Patient's titers were low for Hep B and MMR (mumps).     Here for Hep B vaccine and MMR vaccines  Patient will call PCP office and clarify how many Hep B vaccines are needed     Vaccination Screening Questionnaire filled out by patient and reviewed by myself.  No contraindications to vaccination.  Vaccination information sheet given to patient/parent.  Side effects and risks of vaccination explained and discussed.  Patient/parent voiced understanding and agreed to proceed with vaccination.  Pt tolerated vaccine well.

## 2025-02-26 NOTE — TELEPHONE ENCOUNTER
Left a message on Darion's voice mail to call the office. Notify Darion of Dr. Cleveland' recommendations for immunizations and need to schedule a physical.

## 2025-02-26 NOTE — TELEPHONE ENCOUNTER
Yes, the labs show he is not immune to hepatitis B  And equivocal to MMR so I would just recommend getting the vaccines.  For hepatitis B-he needs a med 0, 1 to 2 months and then 6 months to complete the series of three    For MMR one dose should be enough.    Please also have him set up his physical with me so we can go over all other test results    Orders in.  Can come to our office

## 2025-02-27 LAB
ALBUMIN/GLOBULIN RATIO: 1.9 (CALC) (ref 1–2.5)
ALBUMIN: 4.8 G/DL (ref 3.6–5.1)
ALKALINE PHOSPHATASE: 65 U/L (ref 35–144)
ALT: 28 U/L (ref 9–46)
AST: 25 U/L (ref 10–35)
BILIRUBIN, TOTAL: 0.4 MG/DL (ref 0.2–1.2)
BUN: 16 MG/DL (ref 7–25)
CALCIUM: 10.2 MG/DL (ref 8.6–10.3)
CARBON DIOXIDE: 29 MMOL/L (ref 20–32)
CHLORIDE: 101 MMOL/L (ref 98–110)
CHOL/HDLC RATIO: 4.9 (CALC)
CHOLESTEROL, TOTAL: 285 MG/DL
CREATININE: 1.14 MG/DL (ref 0.7–1.3)
EGFR: 75 ML/MIN/1.73M2
GLOBULIN: 2.5 G/DL (CALC) (ref 1.9–3.7)
GLUCOSE: 119 MG/DL (ref 65–139)
HDL CHOLESTEROL: 58 MG/DL
HEMOGLOBIN A1C: 5.5 % OF TOTAL HGB
LDL-CHOLESTEROL: 164 MG/DL (CALC)
MEASLES ANTIBODY (IGG): 65.3 AU/ML
MUMPS VIRUS$ANTIBODY (IGG): 9.52 AU/ML
NON-HDL CHOLESTEROL: 227 MG/DL (CALC)
POTASSIUM: 4.6 MMOL/L (ref 3.5–5.3)
PROTEIN, TOTAL: 7.3 G/DL (ref 6.1–8.1)
PSA, TOTAL: 1.22 NG/ML
RUBELLA ANTIBODY (IGG): 4.82 INDEX
SODIUM: 140 MMOL/L (ref 135–146)
TRIGLYCERIDES: 369 MG/DL
VARICELLA ZOSTER VIRUS$AB (IGG): 45.7 S/CO

## 2025-02-27 NOTE — TELEPHONE ENCOUNTER
Outbound call attempted with line picked up with no one speaking and disconnected. [id number] message sent to Louie.

## 2025-03-06 ENCOUNTER — OFFICE VISIT (OUTPATIENT)
Dept: INTERNAL MEDICINE CLINIC | Facility: CLINIC | Age: 55
End: 2025-03-06
Payer: COMMERCIAL

## 2025-03-06 VITALS
OXYGEN SATURATION: 96 % | HEIGHT: 75 IN | SYSTOLIC BLOOD PRESSURE: 128 MMHG | HEART RATE: 66 BPM | WEIGHT: 255.19 LBS | DIASTOLIC BLOOD PRESSURE: 80 MMHG | BODY MASS INDEX: 31.73 KG/M2

## 2025-03-06 DIAGNOSIS — Z00.00 PHYSICAL EXAM: Primary | ICD-10-CM

## 2025-03-06 DIAGNOSIS — I10 PRIMARY HYPERTENSION: ICD-10-CM

## 2025-03-06 DIAGNOSIS — E78.5 HYPERLIPIDEMIA, UNSPECIFIED HYPERLIPIDEMIA TYPE: ICD-10-CM

## 2025-03-06 PROCEDURE — 99396 PREV VISIT EST AGE 40-64: CPT | Performed by: FAMILY MEDICINE

## 2025-03-06 RX ORDER — AMLODIPINE BESYLATE 2.5 MG/1
2.5 TABLET ORAL DAILY
Qty: 90 TABLET | Refills: 3 | Status: SHIPPED | OUTPATIENT
Start: 2025-03-06 | End: 2025-03-06

## 2025-03-06 RX ORDER — AMLODIPINE BESYLATE 5 MG/1
5 TABLET ORAL DAILY
Qty: 90 TABLET | Refills: 3 | Status: SHIPPED | OUTPATIENT
Start: 2025-03-06 | End: 2026-03-01

## 2025-03-06 NOTE — PROGRESS NOTES
Louie Arteaga is a 54 year old male who presents for a complete physical exam.   HPI:       Diet: eats healthy   Exercise- yes- crossfit   No CP or SOB  Mood good     HTN- on amlodipine 5 mg   Had been better  Less stressed     HL - labs didn't fast     Alcohol- social  - now working with nerve repair   Wife tammy arteaga  2 kids         Wt Readings from Last 6 Encounters:   03/06/25 255 lb 3.2 oz (115.8 kg)   04/29/24 247 lb (112 kg)   01/25/24 247 lb (112 kg)   11/07/22 240 lb (108.9 kg)   09/08/22 245 lb (111.1 kg)   09/13/21 265 lb (120.2 kg)     Body mass index is 31.9 kg/m².     Cholesterol, Total (mg/dL)   Date Value   09/09/2022 250 (H)   10/17/2017 248 (H)   11/08/2014 232 (H)     CHOLESTEROL, TOTAL (mg/dL)   Date Value   02/24/2025 285 (H)     HDL Cholesterol (mg/dL)   Date Value   09/09/2022 54   10/17/2017 56     HDL CHOLESTEROL (mg/dL)   Date Value   02/24/2025 58     HDL CHOLESTEROL (P) (mg/dL)   Date Value   11/08/2014 59     LDL Cholesterol (mg/dL)   Date Value   09/09/2022 169 (H)   10/17/2017 165 (H)   11/08/2014 154 (H)     LDL-CHOLESTEROL (mg/dL (calc))   Date Value   02/24/2025 164 (H)     AST (U/L)   Date Value   02/24/2025 25   09/09/2022 32   10/17/2017 33   11/08/2014 29     ALT (U/L)   Date Value   02/24/2025 28   09/09/2022 50   10/17/2017 42   11/08/2014 25      Current Outpatient Medications   Medication Sig Dispense Refill    amLODIPine 2.5 MG Oral Tab Take 1 tablet (2.5 mg total) by mouth daily. 90 tablet 3      Past Medical History:    Other and unspecified hyperlipidemia      Past Surgical History:   Procedure Laterality Date    Hernia surgery      Knee arthroscopy      Vasectomy  2015      Family History   Problem Relation Age of Onset    Colon Cancer Father     Cancer Father     Diabetes Maternal Grandmother     Heart Attack Maternal Grandfather     Heart Disorder Paternal Grandmother       Social History:  Social History     Socioeconomic History    Marital status:      Number of children: 3   Occupational History    Occupation:     Tobacco Use    Smoking status: Never    Smokeless tobacco: Never   Substance and Sexual Activity    Alcohol use: Yes     Comment: daily    Drug use: No           REVIEW OF SYSTEMS:   GENERAL: feels well otherwise  LUNGS: denies shortness of breath with exertion  CARDIOVASCULAR: denies chest pain on exertion  GI: denies abdominal pain, constipation or diarrhea  NEURO: denies headaches  PSYCHE: denies depression or anxiety    EXAM:   /80   Pulse 66   Ht 6' 3\" (1.905 m)   Wt 255 lb 3.2 oz (115.8 kg)   SpO2 96%   BMI 31.90 kg/m²   Body mass index is 31.9 kg/m².    GENERAL: well developed, well nourished,in no apparent distress  SKIN: no rashes,no suspicious lesions  HEENT: atraumatic, normocephalic, O/P normal  EYES: nl conjunctiva  NECK: supple,no adenopathy   LUNGS: clear to auscultation  CARDIO: RRR without murmur  GI: good BS's,no masses, HSM or tenderness  EXTREMITIES: no cyanosis, clubbing or edema  NEURO: Oriented times three,cranial nerves are intact,motor and sensory are grossly intact    The 10-year ASCVD risk score (Yaa MANCILLA, et al., 2019) is: 9.3%    Values used to calculate the score:      Age: 54 years      Sex: Male      Is Non- : No      Diabetic: No      Tobacco smoker: No      Systolic Blood Pressure: 128 mmHg      Is BP treated: Yes      HDL Cholesterol: 58 mg/dL      Total Cholesterol: 285 mg/dL        ASSESSMENT AND PLAN:   Louie Cleaning is a 54 year old male who presents for a complete physical exam.  Encounter Diagnoses   Name Primary?    Physical exam Yes    Primary hypertension     Hyperlipidemia, unspecified hyperlipidemia type        Health maintenance:  check fasting Lipids, CMP, CBC    Encouraged regular exercise and low fat diet.   The patient indicates understanding of these issues and agrees to the plan.  The patient is asked to return for CPX in 1 yr  1. Physical  exam  Reviewed blood work     2. Primary hypertension  Controlled CPM   - amLODIPine 5 MG Oral Tab; Take 1 tablet (5 mg total) by mouth daily.  Dispense: 90 tablet; Refill: 3    3. Hyperlipidemia, unspecified hyperlipidemia type  Ascvd 9%- explained signficiance  He was not fasting for the last lipid panel but explained this really only affects his triglycerides.  Discussed options of starting statin or getting calcium score.  Patient will get calcium score checked.  He does not have family history of heart disease in his parents but does in a grandparent   continue healthy diet and exercise.  - CT CALCIUM SCORING; Future        No orders of the defined types were placed in this encounter.      Meds & Refills for this Visit:  Requested Prescriptions     Signed Prescriptions Disp Refills    amLODIPine 2.5 MG Oral Tab 90 tablet 3     Sig: Take 1 tablet (2.5 mg total) by mouth daily.       Imaging & Consults:  CT CALCIUM SCORING

## 2025-03-21 ENCOUNTER — HOSPITAL ENCOUNTER (OUTPATIENT)
Dept: CT IMAGING | Facility: HOSPITAL | Age: 55
Discharge: HOME | End: 2025-03-21
Attending: FAMILY MEDICINE

## 2025-03-21 DIAGNOSIS — E78.5 HYPERLIPIDEMIA, UNSPECIFIED HYPERLIPIDEMIA TYPE: ICD-10-CM

## 2025-03-28 ENCOUNTER — NURSE ONLY (OUTPATIENT)
Dept: INTERNAL MEDICINE CLINIC | Facility: CLINIC | Age: 55
End: 2025-03-28
Payer: COMMERCIAL

## 2025-03-28 PROCEDURE — 90746 HEPB VACCINE 3 DOSE ADULT IM: CPT | Performed by: FAMILY MEDICINE

## 2025-03-28 PROCEDURE — 90471 IMMUNIZATION ADMIN: CPT | Performed by: FAMILY MEDICINE

## 2025-04-22 DIAGNOSIS — I10 PRIMARY HYPERTENSION: ICD-10-CM

## 2025-04-23 DIAGNOSIS — I10 PRIMARY HYPERTENSION: ICD-10-CM

## 2025-04-23 RX ORDER — AMLODIPINE BESYLATE 2.5 MG/1
2.5 TABLET ORAL DAILY
Qty: 90 TABLET | Refills: 1 | OUTPATIENT
Start: 2025-04-23

## 2025-04-24 RX ORDER — AMLODIPINE BESYLATE 5 MG/1
5 TABLET ORAL DAILY
Qty: 90 TABLET | Refills: 3 | Status: SHIPPED | OUTPATIENT
Start: 2025-04-24 | End: 2026-04-19

## 2025-08-12 ENCOUNTER — OFFICE VISIT (OUTPATIENT)
Dept: FAMILY MEDICINE CLINIC | Facility: CLINIC | Age: 55
End: 2025-08-12

## 2025-08-12 DIAGNOSIS — Z23 NEED FOR VACCINATION: Primary | ICD-10-CM

## 2025-08-12 PROCEDURE — 90471 IMMUNIZATION ADMIN: CPT | Performed by: NURSE PRACTITIONER

## 2025-08-12 PROCEDURE — 90746 HEPB VACCINE 3 DOSE ADULT IM: CPT | Performed by: NURSE PRACTITIONER

## (undated) NOTE — LETTER
February 20, 2025          Louie Cleaning  [address]  [facility] [address]          Dear Louie:    The following are the results of your recent tests: Negative TB skin test, 0 mm induration.    Results for orders placed or performed in visit on 02/18/25   TB /PPD intradermal test    Collection Time: 02/20/25  2:04 PM   Result Value Ref Range    Date Given: 2/18/25     Site: Left forearm     INDURATION (PPD) 0 mm 0.0 - 11 mm       Please call if you have further questions.  Sincerely,      BRENDA Ham-BC  Family Nurse Practitioner  [facility]

## (undated) NOTE — LETTER
5/7/2018              [address] [place] [address]         Dear Jordyn Fisher,    This letter is to inform you that our office has made several attempts to reach you by phone to schedule your procedure without success.   We